# Patient Record
Sex: FEMALE | Race: WHITE | NOT HISPANIC OR LATINO
[De-identification: names, ages, dates, MRNs, and addresses within clinical notes are randomized per-mention and may not be internally consistent; named-entity substitution may affect disease eponyms.]

---

## 2020-10-04 DIAGNOSIS — Z01.818 ENCOUNTER FOR OTHER PREPROCEDURAL EXAMINATION: ICD-10-CM

## 2020-10-05 ENCOUNTER — APPOINTMENT (OUTPATIENT)
Dept: DISASTER EMERGENCY | Facility: CLINIC | Age: 75
End: 2020-10-05

## 2020-10-06 LAB — SARS-COV-2 N GENE NPH QL NAA+PROBE: NOT DETECTED

## 2020-10-08 ENCOUNTER — RESULT REVIEW (OUTPATIENT)
Age: 75
End: 2020-10-08

## 2020-10-08 ENCOUNTER — OUTPATIENT (OUTPATIENT)
Dept: OUTPATIENT SERVICES | Facility: HOSPITAL | Age: 75
LOS: 1 days | End: 2020-10-08
Payer: MEDICARE

## 2020-10-08 VITALS
WEIGHT: 250 LBS | HEIGHT: 65 IN | HEART RATE: 77 BPM | TEMPERATURE: 97 F | RESPIRATION RATE: 12 BRPM | SYSTOLIC BLOOD PRESSURE: 155 MMHG | OXYGEN SATURATION: 98 % | DIASTOLIC BLOOD PRESSURE: 64 MMHG

## 2020-10-08 VITALS
HEIGHT: 65 IN | TEMPERATURE: 97 F | RESPIRATION RATE: 20 BRPM | WEIGHT: 250 LBS | SYSTOLIC BLOOD PRESSURE: 135 MMHG | DIASTOLIC BLOOD PRESSURE: 77 MMHG | OXYGEN SATURATION: 100 % | HEART RATE: 88 BPM

## 2020-10-08 DIAGNOSIS — Z46.51 ENCOUNTER FOR FITTING AND ADJUSTMENT OF GASTRIC LAP BAND: Chronic | ICD-10-CM

## 2020-10-08 DIAGNOSIS — K59.01 SLOW TRANSIT CONSTIPATION: ICD-10-CM

## 2020-10-08 DIAGNOSIS — E66.01 MORBID (SEVERE) OBESITY DUE TO EXCESS CALORIES: ICD-10-CM

## 2020-10-08 DIAGNOSIS — Z86.010 PERSONAL HISTORY OF COLONIC POLYPS: ICD-10-CM

## 2020-10-08 DIAGNOSIS — Z90.49 ACQUIRED ABSENCE OF OTHER SPECIFIED PARTS OF DIGESTIVE TRACT: Chronic | ICD-10-CM

## 2020-10-08 PROCEDURE — 88305 TISSUE EXAM BY PATHOLOGIST: CPT | Mod: 26

## 2020-10-08 PROCEDURE — 45385 COLONOSCOPY W/LESION REMOVAL: CPT | Mod: PT

## 2020-10-08 PROCEDURE — C1889: CPT

## 2020-10-08 PROCEDURE — 88305 TISSUE EXAM BY PATHOLOGIST: CPT

## 2020-10-08 PROCEDURE — 45380 COLONOSCOPY AND BIOPSY: CPT | Mod: XS,PT

## 2020-10-08 PROCEDURE — 45381 COLONOSCOPY SUBMUCOUS NJX: CPT | Mod: PT

## 2020-10-08 RX ORDER — SODIUM CHLORIDE 9 MG/ML
1000 INJECTION INTRAMUSCULAR; INTRAVENOUS; SUBCUTANEOUS
Refills: 0 | Status: DISCONTINUED | OUTPATIENT
Start: 2020-10-08 | End: 2020-10-22

## 2020-10-08 RX ADMIN — SODIUM CHLORIDE 30 MILLILITER(S): 9 INJECTION INTRAMUSCULAR; INTRAVENOUS; SUBCUTANEOUS at 08:12

## 2020-10-08 NOTE — PRE-ANESTHESIA EVALUATION ADULT - NSANTHOSAYNRD_GEN_A_CORE
No. LILIBETH screening performed.  STOP BANG Legend: 0-2 = LOW Risk; 3-4 = INTERMEDIATE Risk; 5-8 = HIGH Risk

## 2020-10-08 NOTE — ASU DISCHARGE PLAN (ADULT/PEDIATRIC) - CALL YOUR DOCTOR IF YOU HAVE ANY OF THE FOLLOWING:
Pain not relieved by Medications/Nausea and vomiting that does not stop/Increased irritability or sluggishness/Fever greater than (need to indicate Fahrenheit or Celsius)/Unable to urinate/Excessive diarrhea/100.4 F/Inability to tolerate liquids or foods/Bleeding that does not stop

## 2020-10-08 NOTE — ASU PATIENT PROFILE, ADULT - PMH
High cholesterol    Hypertension, unspecified type  patient poor historian. Patient unsure why she takes amlodipine. Patient states BP goes high and low  Malignant neoplasm of uterus, unspecified site

## 2020-10-08 NOTE — ASU DISCHARGE PLAN (ADULT/PEDIATRIC) - ASU DC SPECIAL INSTRUCTIONSFT
Follow up visit with Dr. Patricia Regalado in about one week to discuss pathology and to determine further management.

## 2020-10-08 NOTE — PROGRESS NOTE ADULT - SUBJECTIVE AND OBJECTIVE BOX
Attending Physician:                            Procedure:    Indication for Procedure:  ________________________________________________________  PAST MEDICAL & SURGICAL HISTORY:  Hypertension, unspecified type  patient poor historian. Patient unsure why she takes amlodipine. Patient states BP goes high and low    Malignant neoplasm of uterus, unspecified site    High cholesterol    History of cholecystectomy    Admission for adjustment of gastric lap band  gastric lap band      ALLERGIES:  latex (Rash)  No Known Drug Allergies    HOME MEDICATIONS:  amLODIPine 2.5 mg oral tablet: 1 tab(s) orally once a day  aspirin 81 mg oral tablet, chewable: 1 tab(s) orally once a day  atorvastatin 10 mg oral tablet: 1 tab(s) orally once a day  doxycycline 20 mg oral capsule: 1 cap(s) orally every 12 hours  Lasix 20 mg oral tablet: 1 tab(s) orally once a day  Vitamin D3 2000 intl units (50 mcg) oral tablet:     AICD/PPM: [ ] yes   [ ] no    PERTINENT LAB DATA:                      PHYSICAL EXAMINATION:    Height (cm): 165.1  Weight (kg): 113.4  BMI (kg/m2): 41.6  BSA (m2): 2.17T(C): 36.2  HR: 77  BP: 155/64  RR: 12  SpO2: 98%    Constitutional: NAD    Neck:  No JVD  Respiratory: CTAB/L  Cardiovascular: S1 and S2  Gastrointestinal: BS+, soft, NT/ND  Extremities: No peripheral edema  Neurological: A/O x 3, no focal deficits        COMMENTS:    The patient is a suitable candidate for the planned procedure unless box checked [ ]  No, explain:

## 2020-10-12 LAB — SURGICAL PATHOLOGY STUDY: SIGNIFICANT CHANGE UP

## 2020-10-15 PROBLEM — E78.00 PURE HYPERCHOLESTEROLEMIA, UNSPECIFIED: Chronic | Status: ACTIVE | Noted: 2020-10-08

## 2020-10-19 DIAGNOSIS — Z86.010 PERSONAL HISTORY OF COLONIC POLYPS: ICD-10-CM

## 2020-10-19 DIAGNOSIS — K21.9 GASTRO-ESOPHAGEAL REFLUX DISEASE W/OUT ESOPHAGITIS: ICD-10-CM

## 2020-10-19 DIAGNOSIS — Z85.42 PERSONAL HISTORY OF MALIGNANT NEOPLASM OF OTHER PARTS OF UTERUS: ICD-10-CM

## 2020-10-19 DIAGNOSIS — K06.9 DISORDER OF GINGIVA AND EDENTULOUS ALVEOLAR RIDGE, UNSPECIFIED: ICD-10-CM

## 2020-10-19 DIAGNOSIS — Z83.3 FAMILY HISTORY OF DIABETES MELLITUS: ICD-10-CM

## 2020-10-19 DIAGNOSIS — Z87.19 PERSONAL HISTORY OF OTHER DISEASES OF THE DIGESTIVE SYSTEM: ICD-10-CM

## 2020-10-19 DIAGNOSIS — R09.89 OTHER SPECIFIED SYMPTOMS AND SIGNS INVOLVING THE CIRCULATORY AND RESPIRATORY SYSTEMS: ICD-10-CM

## 2020-10-19 DIAGNOSIS — G47.30 SLEEP APNEA, UNSPECIFIED: ICD-10-CM

## 2020-10-19 DIAGNOSIS — Z82.5 FAMILY HISTORY OF ASTHMA AND OTHER CHRONIC LOWER RESPIRATORY DISEASES: ICD-10-CM

## 2020-10-19 RX ORDER — AMLODIPINE BESYLATE 5 MG/1
TABLET ORAL
Refills: 0 | Status: ACTIVE | COMMUNITY

## 2020-10-19 RX ORDER — DOXYCYCLINE HYCLATE 50 MG/1
CAPSULE ORAL
Refills: 0 | Status: ACTIVE | COMMUNITY

## 2020-10-19 RX ORDER — ATORVASTATIN CALCIUM 80 MG/1
TABLET, FILM COATED ORAL
Refills: 0 | Status: ACTIVE | COMMUNITY

## 2020-10-19 RX ORDER — FUROSEMIDE 20 MG/1
20 TABLET ORAL
Refills: 0 | Status: ACTIVE | COMMUNITY

## 2020-10-20 ENCOUNTER — APPOINTMENT (OUTPATIENT)
Dept: SURGERY | Facility: CLINIC | Age: 75
End: 2020-10-20
Payer: MEDICARE

## 2020-10-20 VITALS
TEMPERATURE: 97 F | WEIGHT: 250 LBS | DIASTOLIC BLOOD PRESSURE: 88 MMHG | HEART RATE: 72 BPM | RESPIRATION RATE: 16 BRPM | HEIGHT: 65 IN | BODY MASS INDEX: 41.65 KG/M2 | SYSTOLIC BLOOD PRESSURE: 165 MMHG | OXYGEN SATURATION: 98 %

## 2020-10-20 DIAGNOSIS — D12.1 BENIGN NEOPLASM OF APPENDIX: ICD-10-CM

## 2020-10-20 PROCEDURE — 99204 OFFICE O/P NEW MOD 45 MIN: CPT

## 2020-10-20 RX ORDER — GLUCOSA SU 2KCL/CHONDROITIN SU 500-400 MG
CAPSULE ORAL
Refills: 0 | Status: DISCONTINUED | COMMUNITY
End: 2020-10-20

## 2020-10-20 RX ORDER — CHROMIUM 200 MCG
TABLET ORAL
Refills: 0 | Status: ACTIVE | COMMUNITY

## 2020-10-20 NOTE — CONSULT LETTER
[Dear  ___] : Dear  [unfilled], [Consult Letter:] : I had the pleasure of evaluating your patient, [unfilled]. [Please see my note below.] : Please see my note below. [Consult Closing:] : Thank you very much for allowing me to participate in the care of this patient.  If you have any questions, please do not hesitate to contact me. [Sincerely,] : Sincerely, [FreeTextEntry3] : Salvatore Bennett M.D., F.A.C.S, F.A.S.C.R.S [DrKeira  ___] : Dr. CHAVEZ

## 2020-10-20 NOTE — ASSESSMENT
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  I recommended she undergo a laparoscopic appendectomy.  I explained the risks benefits and alternatives including the risks of bleeding infection the possible need for an open procedure and the low likelihood of malignancy on the final pathology which could potentially necessitate a right hemicolectomy.  I ordered a CT abdomen and pelvis to better evaluate her abdominal wall anatomy and to assess for position of the LAP-BAND.  She also has an appointment with an interventional gastroenterologist to discuss possible colonoscopic management of her polyp.

## 2020-10-20 NOTE — PHYSICAL EXAM
[Abdomen Tenderness] : ~T No ~M abdominal tenderness [No HSM] : no hepatosplenomegaly [Exam Deferred] : exam was deferred [Wheezing] : no wheezing was heard [Normal Rate and Rhythm] : normal rate and rhythm [No Rash or Lesion] : No rash or lesion [Alert] : alert [Calm] : calm [de-identified] : Obese, soft, nontender, well-healed midline incision without incisional hernias.  There is a palpable LAP-BAND port in the left upper quadrant. [de-identified] : nad [de-identified] : nl

## 2020-10-20 NOTE — HISTORY OF PRESENT ILLNESS
[FreeTextEntry1] : Heidi is a 74 year old female here for a f/u after colonoscopy at [facility] (10/08/20). \par Prolapsing polyp in appendix, could not be removed during colonoscopy. A 2 cm irregular appearing polyp in the sigmoid colon, removed and clip placed over polypectomy site. Both areas tattooed. \par Descending colon polyp: Tubular adenoma, Hepatic flexure polyp: hyperplastic, Cecum: hyperplastic. Appendix polyp: Sessile serrated adenoma with low grade dysplasia. Ascending colon "thick fold": no diagnostic histopathologic changes. Transverse colon polyp: Tubular Adenoma. \par Sigmoid colon polyp: Tubulovillous adenoma. Pmh: Uterine Cancer, s/p hysterectomy. Cholelithiasis, s/p cholecystectomy. S/P  section.  She also has a history of a LAP-BAND..  She states that she lost 80 pounds initially but gained back 30.  She has not had any recent imaging or band adjustments.  Her previous bariatric surgeon has retired.\par \par Today, patient reports feeling well. Denies abdominal pain. Hx of constipation.

## 2020-10-21 ENCOUNTER — OUTPATIENT (OUTPATIENT)
Dept: OUTPATIENT SERVICES | Facility: HOSPITAL | Age: 75
LOS: 1 days | End: 2020-10-21
Payer: MEDICARE

## 2020-10-21 ENCOUNTER — APPOINTMENT (OUTPATIENT)
Dept: CT IMAGING | Facility: CLINIC | Age: 75
End: 2020-10-21
Payer: MEDICARE

## 2020-10-21 DIAGNOSIS — Z46.51 ENCOUNTER FOR FITTING AND ADJUSTMENT OF GASTRIC LAP BAND: Chronic | ICD-10-CM

## 2020-10-21 DIAGNOSIS — Z90.49 ACQUIRED ABSENCE OF OTHER SPECIFIED PARTS OF DIGESTIVE TRACT: Chronic | ICD-10-CM

## 2020-10-21 DIAGNOSIS — D12.1 BENIGN NEOPLASM OF APPENDIX: ICD-10-CM

## 2020-10-21 PROCEDURE — 82565 ASSAY OF CREATININE: CPT

## 2020-10-21 PROCEDURE — 74177 CT ABD & PELVIS W/CONTRAST: CPT

## 2020-10-21 PROCEDURE — 74177 CT ABD & PELVIS W/CONTRAST: CPT | Mod: 26

## 2020-11-09 ENCOUNTER — OUTPATIENT (OUTPATIENT)
Dept: OUTPATIENT SERVICES | Facility: HOSPITAL | Age: 75
LOS: 1 days | End: 2020-11-09
Payer: MEDICARE

## 2020-11-09 VITALS
RESPIRATION RATE: 17 BRPM | SYSTOLIC BLOOD PRESSURE: 172 MMHG | HEIGHT: 65 IN | TEMPERATURE: 97 F | OXYGEN SATURATION: 96 % | DIASTOLIC BLOOD PRESSURE: 86 MMHG | HEART RATE: 83 BPM | WEIGHT: 251.99 LBS

## 2020-11-09 DIAGNOSIS — Z90.49 ACQUIRED ABSENCE OF OTHER SPECIFIED PARTS OF DIGESTIVE TRACT: Chronic | ICD-10-CM

## 2020-11-09 DIAGNOSIS — Z98.890 OTHER SPECIFIED POSTPROCEDURAL STATES: Chronic | ICD-10-CM

## 2020-11-09 DIAGNOSIS — D12.1 BENIGN NEOPLASM OF APPENDIX: ICD-10-CM

## 2020-11-09 DIAGNOSIS — G47.33 OBSTRUCTIVE SLEEP APNEA (ADULT) (PEDIATRIC): ICD-10-CM

## 2020-11-09 DIAGNOSIS — Z01.818 ENCOUNTER FOR OTHER PREPROCEDURAL EXAMINATION: ICD-10-CM

## 2020-11-09 DIAGNOSIS — Z46.51 ENCOUNTER FOR FITTING AND ADJUSTMENT OF GASTRIC LAP BAND: Chronic | ICD-10-CM

## 2020-11-09 DIAGNOSIS — Z98.84 BARIATRIC SURGERY STATUS: ICD-10-CM

## 2020-11-09 DIAGNOSIS — Z90.710 ACQUIRED ABSENCE OF BOTH CERVIX AND UTERUS: Chronic | ICD-10-CM

## 2020-11-09 DIAGNOSIS — D3A.020 BENIGN CARCINOID TUMOR OF THE APPENDIX: ICD-10-CM

## 2020-11-09 PROCEDURE — G0463: CPT

## 2020-11-09 PROCEDURE — U0003: CPT

## 2020-11-09 RX ORDER — CHOLECALCIFEROL (VITAMIN D3) 125 MCG
0 CAPSULE ORAL
Qty: 0 | Refills: 0 | DISCHARGE

## 2020-11-09 NOTE — H&P PST ADULT - NSICDXPASTSURGICALHX_GEN_ALL_CORE_FT
PAST SURGICAL HISTORY:  History of cholecystectomy many years ago    S/P colonoscopic polypectomy 10/2020    S/P complete hysterectomy [place] [id number]

## 2020-11-09 NOTE — H&P PST ADULT - NSICDXPASTMEDICALHX_GEN_ALL_CORE_FT
PAST MEDICAL HISTORY:  Benign hypertension     Benign neoplasm of appendix     Gastric banding status     H/O constipation     High cholesterol     [place] (hard of hearing) left hearing aid    Morbid obesity     Shingles in childhood    Uterine cancer surgery 1999  radiation     PAST MEDICAL HISTORY:  Benign hypertension     Benign neoplasm of appendix     Gastric banding status     H/O constipation     High cholesterol     [place] (hard of hearing) left hearing aid    Morbid obesity     LILIBETH (obstructive sleep apnea)     Shingles in childhood    Uterine cancer surgery 1999  radiation

## 2020-11-09 NOTE — H&P PST ADULT - NSICDXPROBLEM_GEN_ALL_CORE_FT
PROBLEM DIAGNOSES  Problem: Benign neuroendocrine neoplasm of appendix  Assessment and Plan: laparoscopic appenedctomy  possible open    Problem: Personal history of gastric banding  Assessment and Plan: pt to call and make appointment for deflation    Problem: LILIBETH (obstructive sleep apnea)  Assessment and Plan: sleep apnea protocol

## 2020-11-09 NOTE — H&P PST ADULT - HISTORY OF PRESENT ILLNESS
74 yr old female with hx of obesity, s/p lap band , hypertension, had routine colonoscopy dx with polyps Need surgery to remove polyps in appendix.     ***Lap band still intact called to IR to have deflated are closed. Pt given # and instructions to call in am and get it deflated.    **COVID  denies travel  denies s/s  denies known exposure  Swab at pst

## 2020-11-10 ENCOUNTER — APPOINTMENT (OUTPATIENT)
Dept: SURGERY | Facility: CLINIC | Age: 75
End: 2020-11-10
Payer: MEDICARE

## 2020-11-10 ENCOUNTER — TRANSCRIPTION ENCOUNTER (OUTPATIENT)
Age: 75
End: 2020-11-10

## 2020-11-10 VITALS
SYSTOLIC BLOOD PRESSURE: 152 MMHG | BODY MASS INDEX: 42.15 KG/M2 | WEIGHT: 253 LBS | DIASTOLIC BLOOD PRESSURE: 84 MMHG | RESPIRATION RATE: 18 BRPM | OXYGEN SATURATION: 98 % | HEIGHT: 65 IN | HEART RATE: 72 BPM

## 2020-11-10 DIAGNOSIS — Z46.51 ENCOUNTER FOR FITTING AND ADJUSTMENT OF GASTRIC LAP BAND: ICD-10-CM

## 2020-11-10 PROCEDURE — S2083 ADJUSTMENT GASTRIC BAND: CPT

## 2020-11-10 PROCEDURE — 99202 OFFICE O/P NEW SF 15 MIN: CPT | Mod: 25

## 2020-11-10 RX ORDER — NEOMYCIN SULFATE 500 MG/1
500 TABLET ORAL
Qty: 3 | Refills: 0 | Status: DISCONTINUED | COMMUNITY
Start: 2020-11-05 | End: 2020-11-10

## 2020-11-10 RX ORDER — METRONIDAZOLE 250 MG/1
250 TABLET ORAL
Qty: 3 | Refills: 0 | Status: DISCONTINUED | COMMUNITY
Start: 2020-11-05 | End: 2020-11-10

## 2020-11-10 NOTE — REASON FOR VISIT
[Initial Consult] : an initial consult for [S/P Bariatric Surgery] : s/p bariatric surgery [Band Adjustment] : band adjustment

## 2020-11-10 NOTE — HISTORY OF PRESENT ILLNESS
[de-identified] : Heidi No is a 74 year old female here for Lap band adjustment. \par \par She had her LAP-BAND done at [place] with Dr. Shipman over 10 years ago.  She states she has not had an adjustment in several years.  She presents today to empty her LAP-BAND in preparation for planned surgery tomorrow.  She is scheduled to have a laparoscopic appendectomy with Dr. 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  She says she lost over 100 pounds initially with a LAP-BAND and has since regained some of that.  She does not report regular reflux or heartburn.  She states she has no abdominal pain.

## 2020-11-10 NOTE — ASSESSMENT
[FreeTextEntry1] : 74-year-old female with lap band who requires adjustment of the band in preparation for planned surgery tomorrow.\par \par 1.0 mL was removed from the LAP-BAND today, leaving 0 mL in the LAP-BAND.\par \par

## 2020-11-10 NOTE — PROCEDURE
[FreeTextEntry1] : Skin prepped with alcohol\par Band port accessed with Nice needle\par \par 1.0 mL removed\par \par  0 mL total

## 2020-11-10 NOTE — PHYSICAL EXAM
[Obese, well nourished, in no acute distress] : obese, well nourished, in no acute distress [Normal] : pharynx is unremarkable, moist mucus membrane, no oral lesions [de-identified] : normal respirations and chest expansion  [de-identified] : soft, nontender, nondistended, port palpable left abdomen.

## 2020-11-11 ENCOUNTER — OUTPATIENT (OUTPATIENT)
Dept: OUTPATIENT SERVICES | Facility: HOSPITAL | Age: 75
LOS: 1 days | End: 2020-11-11
Payer: MEDICARE

## 2020-11-11 ENCOUNTER — RESULT REVIEW (OUTPATIENT)
Age: 75
End: 2020-11-11

## 2020-11-11 ENCOUNTER — APPOINTMENT (OUTPATIENT)
Dept: SURGERY | Facility: HOSPITAL | Age: 75
End: 2020-11-11
Payer: MEDICARE

## 2020-11-11 VITALS
OXYGEN SATURATION: 99 % | HEART RATE: 88 BPM | DIASTOLIC BLOOD PRESSURE: 83 MMHG | SYSTOLIC BLOOD PRESSURE: 158 MMHG | WEIGHT: 251.99 LBS | TEMPERATURE: 98 F | HEIGHT: 65 IN | RESPIRATION RATE: 18 BRPM

## 2020-11-11 VITALS — HEART RATE: 98 BPM | OXYGEN SATURATION: 97 %

## 2020-11-11 DIAGNOSIS — D12.1 BENIGN NEOPLASM OF APPENDIX: ICD-10-CM

## 2020-11-11 DIAGNOSIS — Z98.890 OTHER SPECIFIED POSTPROCEDURAL STATES: Chronic | ICD-10-CM

## 2020-11-11 DIAGNOSIS — Z90.710 ACQUIRED ABSENCE OF BOTH CERVIX AND UTERUS: Chronic | ICD-10-CM

## 2020-11-11 DIAGNOSIS — Z90.49 ACQUIRED ABSENCE OF OTHER SPECIFIED PARTS OF DIGESTIVE TRACT: Chronic | ICD-10-CM

## 2020-11-11 DIAGNOSIS — Z01.818 ENCOUNTER FOR OTHER PREPROCEDURAL EXAMINATION: ICD-10-CM

## 2020-11-11 PROBLEM — G47.33 OBSTRUCTIVE SLEEP APNEA (ADULT) (PEDIATRIC): Chronic | Status: ACTIVE | Noted: 2020-11-09

## 2020-11-11 PROBLEM — H91.90 UNSPECIFIED HEARING LOSS, UNSPECIFIED EAR: Chronic | Status: ACTIVE | Noted: 2020-11-09

## 2020-11-11 PROBLEM — C55 MALIGNANT NEOPLASM OF UTERUS, PART UNSPECIFIED: Chronic | Status: ACTIVE | Noted: 2020-11-09

## 2020-11-11 PROBLEM — Z98.84 BARIATRIC SURGERY STATUS: Chronic | Status: ACTIVE | Noted: 2020-11-09

## 2020-11-11 PROBLEM — E66.01 MORBID (SEVERE) OBESITY DUE TO EXCESS CALORIES: Chronic | Status: ACTIVE | Noted: 2020-11-09

## 2020-11-11 PROBLEM — B02.9 ZOSTER WITHOUT COMPLICATIONS: Chronic | Status: ACTIVE | Noted: 2020-11-09

## 2020-11-11 PROBLEM — Z87.19 PERSONAL HISTORY OF OTHER DISEASES OF THE DIGESTIVE SYSTEM: Chronic | Status: ACTIVE | Noted: 2020-11-09

## 2020-11-11 PROBLEM — I10 ESSENTIAL (PRIMARY) HYPERTENSION: Chronic | Status: ACTIVE | Noted: 2020-11-09

## 2020-11-11 LAB
BLD GP AB SCN SERPL QL: NEGATIVE — SIGNIFICANT CHANGE UP
RH IG SCN BLD-IMP: POSITIVE — SIGNIFICANT CHANGE UP

## 2020-11-11 PROCEDURE — 86900 BLOOD TYPING SEROLOGIC ABO: CPT

## 2020-11-11 PROCEDURE — C1889: CPT

## 2020-11-11 PROCEDURE — 44204 LAPARO PARTIAL COLECTOMY: CPT | Mod: 52

## 2020-11-11 PROCEDURE — 86850 RBC ANTIBODY SCREEN: CPT

## 2020-11-11 PROCEDURE — 88307 TISSUE EXAM BY PATHOLOGIST: CPT

## 2020-11-11 PROCEDURE — 88307 TISSUE EXAM BY PATHOLOGIST: CPT | Mod: 26

## 2020-11-11 PROCEDURE — 44970 LAPAROSCOPY APPENDECTOMY: CPT

## 2020-11-11 PROCEDURE — 86901 BLOOD TYPING SEROLOGIC RH(D): CPT

## 2020-11-11 PROCEDURE — C9399: CPT

## 2020-11-11 RX ORDER — OXYCODONE HYDROCHLORIDE 5 MG/1
1 TABLET ORAL
Qty: 30 | Refills: 0
Start: 2020-11-11 | End: 2020-11-15

## 2020-11-11 RX ORDER — OXYCODONE HYDROCHLORIDE 5 MG/1
1 TABLET ORAL
Qty: 12 | Refills: 0
Start: 2020-11-11 | End: 2020-11-12

## 2020-11-11 RX ORDER — FENTANYL CITRATE 50 UG/ML
50 INJECTION INTRAVENOUS ONCE
Refills: 0 | Status: DISCONTINUED | OUTPATIENT
Start: 2020-11-11 | End: 2020-11-11

## 2020-11-11 RX ORDER — CEFOTETAN DISODIUM 1 G
2 VIAL (EA) INJECTION ONCE
Refills: 0 | Status: DISCONTINUED | OUTPATIENT
Start: 2020-11-11 | End: 2020-11-11

## 2020-11-11 RX ORDER — LIDOCAINE HCL 20 MG/ML
0.2 VIAL (ML) INJECTION ONCE
Refills: 0 | Status: DISCONTINUED | OUTPATIENT
Start: 2020-11-11 | End: 2020-11-11

## 2020-11-11 RX ORDER — FENTANYL CITRATE 50 UG/ML
25 INJECTION INTRAVENOUS
Refills: 0 | Status: DISCONTINUED | OUTPATIENT
Start: 2020-11-11 | End: 2020-11-11

## 2020-11-11 RX ORDER — SODIUM CHLORIDE 9 MG/ML
3 INJECTION INTRAMUSCULAR; INTRAVENOUS; SUBCUTANEOUS EVERY 8 HOURS
Refills: 0 | Status: DISCONTINUED | OUTPATIENT
Start: 2020-11-11 | End: 2020-11-11

## 2020-11-11 RX ORDER — CHLORHEXIDINE GLUCONATE 213 G/1000ML
1 SOLUTION TOPICAL ONCE
Refills: 0 | Status: DISCONTINUED | OUTPATIENT
Start: 2020-11-11 | End: 2020-11-11

## 2020-11-11 RX ORDER — ONDANSETRON 8 MG/1
4 TABLET, FILM COATED ORAL ONCE
Refills: 0 | Status: DISCONTINUED | OUTPATIENT
Start: 2020-11-11 | End: 2020-11-11

## 2020-11-11 NOTE — ASU DISCHARGE PLAN (ADULT/PEDIATRIC) - CARE PROVIDER_API CALL
Salvatore Bennett  COLON/RECTAL SURGERY  [address] [facility], [facility] [address]  [place] [address]  Phone: [phone]  Fax: [phone]  Follow Up Time:

## 2020-11-11 NOTE — BRIEF OPERATIVE NOTE - OPERATION/FINDINGS
Laparoscopic appendectomy, COLLEEN.   Access gained in LUQ with Veress needle and 5mm port placed there  3 other ports in standard position  Mesoappendix taken with Ligasure  Base taken with EndoGIA purple load with a generous cuff of cecum to ensure polyp contained within specimen.

## 2020-11-11 NOTE — PRE-ANESTHESIA EVALUATION ADULT - NSANTHPMHFT_GEN_ALL_CORE
74F PMHx HTN (on amlodipine, lasix), HLD, GERD, obesity s/p lap band (lap band recently released by Dr. Bennett's office). Patient reports that her HTN is normally well controlled. Denies CP/SOB at rest and with ADL but patient notes that she is somewhat inactive. Stress test 10/2018 demonstrates no scintigraphic evidence of definite ischemia. 11/18 TTE: LVEF calculated 73%, grade I diastolic dysfunction, normal aortic valve. Patient notes allergy to prolonged adhesive bandaid exposure and remote history of cough with ACE inhibitors. Previously diagnosed with LILIBETH, never on CPAP. 74F PMHx HTN (on amlodipine, lasix), HLD, GERD, obesity s/p lap band (lap band recently released by Dr. Bennett's office). Patient reports that her HTN is normally well controlled. Denies CP/SOB at rest and with ADL but patient notes that she is somewhat inactive. Stress test 10/2018 demonstrates no scintigraphic evidence of definite ischemia. 11/18 TTE: LVEF calculated 73%, grade I diastolic dysfunction, normal aortic valve. Patient notes allergy to prolonged adhesive bandaid exposure and remote history of cough with ACE inhibitors. Previously diagnosed with LILIBETH, never on CPAP. Patient found to have appendiceal polyps on colonoscopy, now for appendectomy.

## 2020-11-11 NOTE — ASU DISCHARGE PLAN (ADULT/PEDIATRIC) - ASU DC SPECIAL INSTRUCTIONSFT
WOUND CARE:  Please keep incisions clean and dry. Please do not Scrub or rub incisions. Do not use lotion or powder on incisions.   BATHING: Please do not submerge wound underwater. You may shower and/or sponge bathe.  ACTIVITY: No heavy lifting or straining. Otherwise, you may return to your usual level of physical activity. If you are taking narcotic pain medication (such as oxycodone) DO NOT drive a car, operate machinery or make important decisions.  DIET: Return to your usual diet.  NOTIFY YOUR SURGEON IF: You have any bleeding that does not stop, any pus draining from your wound(s), any fever (over 100.4 F) or chills, persistent nausea/vomiting, persistent diarrhea, or if your pain is not controlled on your discharge pain medications.  FOLLOW-UP: Please follow up with your primary care physician in one week regarding your hospitalization. Please follow-up with your surgeon, within 7 days following discharge- please call to schedule an appointment.

## 2020-11-24 ENCOUNTER — APPOINTMENT (OUTPATIENT)
Dept: SURGERY | Facility: CLINIC | Age: 75
End: 2020-11-24
Payer: MEDICARE

## 2020-11-24 VITALS
OXYGEN SATURATION: 98 % | DIASTOLIC BLOOD PRESSURE: 81 MMHG | TEMPERATURE: 97.9 F | HEART RATE: 85 BPM | SYSTOLIC BLOOD PRESSURE: 152 MMHG | RESPIRATION RATE: 16 BRPM

## 2020-11-24 DIAGNOSIS — Z09 ENCOUNTER FOR FOLLOW-UP EXAMINATION AFTER COMPLETED TREATMENT FOR CONDITIONS OTHER THAN MALIGNANT NEOPLASM: ICD-10-CM

## 2020-11-24 PROCEDURE — 99024 POSTOP FOLLOW-UP VISIT: CPT

## 2020-11-24 NOTE — HISTORY OF PRESENT ILLNESS
[FreeTextEntry1] : Heidi is a 73 y/o female here for post-operative visit. 11/11/20- laparoscopic lysis of adhesions and laparoscopic appendectomy with resection at the base of the cecum. Pathology: Appendix and base of cecum, resection- sessile serrated lesion with focal low-grade adenomatous dysplasia, see note. Cecal margin negative for serrated lesion. Today, patient reports feeling well. Denies incisional pain. Reports 3 BMs daily.

## 2020-11-24 NOTE — ASSESSMENT
[FreeTextEntry1] : I, Carolyn Bagley RN, attest I was present throughout the visit.\par \par In summary the patient is doing well status post laparoscopic appendectomy with resection of the base of the cecum for a sessile serrated adenoma of the appendiceal orifice.  The cecal margin is negative.  I reassured the patient that there is no further work-up or treatment indicated.  She will follow-up with Dr. Patricia Regalado for surveillance colonoscopy and will follow up with Dr. Choudhury for management of her LAP-BAND.\par

## 2020-11-27 NOTE — PRE-ANESTHESIA EVALUATION ADULT - WEIGHT IN LBS
----- Message from Nano Ford MD sent at 11/27/2020  3:44 PM CST -----  Called with CT scan results- no answer so voicemail left with results as negative   251.9

## 2020-12-07 ENCOUNTER — APPOINTMENT (OUTPATIENT)
Dept: SURGERY | Facility: CLINIC | Age: 75
End: 2020-12-07
Payer: MEDICARE

## 2020-12-07 VITALS
TEMPERATURE: 97.6 F | DIASTOLIC BLOOD PRESSURE: 76 MMHG | SYSTOLIC BLOOD PRESSURE: 130 MMHG | HEIGHT: 65 IN | BODY MASS INDEX: 42.49 KG/M2 | WEIGHT: 255 LBS | RESPIRATION RATE: 16 BRPM | OXYGEN SATURATION: 98 % | HEART RATE: 75 BPM

## 2020-12-07 DIAGNOSIS — Z98.84 BARIATRIC SURGERY STATUS: ICD-10-CM

## 2020-12-07 DIAGNOSIS — E66.9 OBESITY, UNSPECIFIED: ICD-10-CM

## 2020-12-07 PROCEDURE — 99213 OFFICE O/P EST LOW 20 MIN: CPT | Mod: 25

## 2020-12-07 PROCEDURE — S2083 ADJUSTMENT GASTRIC BAND: CPT

## 2020-12-07 NOTE — PLAN
[FreeTextEntry1] : [] liquids PO x 2 days, followed by 2 days of pureed foods, followed by regular diet\par [] advised to maintain appropriate lap band diet, chew thoroughly, and eat slowly\par [] regular follow-up with nutritionist is recommended\par [] exercise as tolerated\par [] Reminded patient that she is due for her annual upper GI series.\par \par F/u 1 month

## 2020-12-07 NOTE — PHYSICAL EXAM
[Obese, well nourished, in no acute distress] : obese, well nourished, in no acute distress [Normal] : pharynx is unremarkable, moist mucus membrane, no oral lesions [de-identified] : normal respirations and chest expansion  [de-identified] : soft, nontender, nondistended, port palpable left abdomen.

## 2020-12-07 NOTE — HISTORY OF PRESENT ILLNESS
[de-identified] : Heidi is a 75 y/o female here for follow up visit. She had her LAP-BAND done at [place] with Dr. Shipman over 10 years ago. She is s/p laparoscopic lysis of adhesions and laparoscopic appendectomy with resection at the base of the cecum by Dr. Bennett on 11/11/20. Had fluid removed prior to surgery.  She returns today to have fluid replaced in the LAP-BAND.\par \par She reports dysphagia, however only when she eats chicken.  She otherwise is doing well and has no complaints.  She does not feel she has good restriction now with the band empty.

## 2020-12-07 NOTE — BRIEF OPERATIVE NOTE - NSICDXBRIEFPOSTOP_GEN_ALL_CORE_FT
no lesions,  no deformities,  no traumatic injuries,  no significant scars are present,  chest wall non-tender,  no masses present, breathing is unlabored without accessory muscle use, normal breath sounds
POST-OP DIAGNOSIS:  Appendiceal tumor 11-Nov-2020 09:42:38  Isael Hernandez

## 2020-12-07 NOTE — PROCEDURE
[FreeTextEntry1] : Skin prepped with alcohol\par Band port accessed with Nice needle\par \par 0.5 mL found in lap band\par 1 mL added \par \par 1.5 mL total in lap band

## 2020-12-07 NOTE — ASSESSMENT
[FreeTextEntry1] : 74-year-old female with lap band who requires adjustment of the band to assist in weight loss\par \par 1.0 mL was added to the LAP-BAND today, leaving 1.5 mL in the LAP-BAND.\par \par

## 2021-02-18 NOTE — PRE-OP CHECKLIST - BOWEL PREP
Contacted patient regarding her voice mail.  Questions / concerns addressed.  Patient appreciative of care.       done/0500 ensure. prep 11/10/20

## 2021-07-20 NOTE — ASU PREOP CHECKLIST - HEIGHT IN FEET
----- Message from [facility] sent at 7/20/2021 10:11 AM EDT -----  Subject: Message to Provider    QUESTIONS  Information for Provider? Son Buck Pena, called back, returning the call to   office. Told him patient (mom) needs to schedule AWV after 8/20/21 per   note. He said he cannot say for sure at this time, but will call back at a   later time to do so. NO need to callback, Thank You.   ---------------------------------------------------------------------------  --------------  CALL BACK INFO  What is the best way for the office to contact you? Do not leave any   message, patient will call back for answer  Preferred Call Back Phone Number? [phone]  ---------------------------------------------------------------------------  --------------  SCRIPT ANSWERS  Relationship to Patient? Other  Representative Name? Mireya Rivero  Is the Representative on the appropriate HIPAA document in Epic?  Yes 5

## 2021-12-03 ENCOUNTER — APPOINTMENT (OUTPATIENT)
Dept: MAMMOGRAPHY | Facility: CLINIC | Age: 76
End: 2021-12-03
Payer: MEDICARE

## 2021-12-03 PROCEDURE — 77063 BREAST TOMOSYNTHESIS BI: CPT

## 2021-12-03 PROCEDURE — 77067 SCR MAMMO BI INCL CAD: CPT

## 2022-12-06 ENCOUNTER — APPOINTMENT (OUTPATIENT)
Dept: RADIOLOGY | Facility: CLINIC | Age: 77
End: 2022-12-06

## 2022-12-06 ENCOUNTER — APPOINTMENT (OUTPATIENT)
Dept: MAMMOGRAPHY | Facility: CLINIC | Age: 77
End: 2022-12-06

## 2022-12-06 PROCEDURE — 77080 DXA BONE DENSITY AXIAL: CPT

## 2022-12-06 PROCEDURE — 77067 SCR MAMMO BI INCL CAD: CPT

## 2022-12-06 PROCEDURE — 77063 BREAST TOMOSYNTHESIS BI: CPT

## 2023-10-19 ENCOUNTER — TRANSCRIPTION ENCOUNTER (OUTPATIENT)
Age: 78
End: 2023-10-19

## 2023-10-19 ENCOUNTER — OUTPATIENT (OUTPATIENT)
Dept: OUTPATIENT SERVICES | Facility: HOSPITAL | Age: 78
LOS: 1 days | End: 2023-10-19
Payer: MEDICARE

## 2023-10-19 VITALS
HEART RATE: 70 BPM | HEIGHT: 63 IN | DIASTOLIC BLOOD PRESSURE: 71 MMHG | WEIGHT: 240.08 LBS | OXYGEN SATURATION: 99 % | SYSTOLIC BLOOD PRESSURE: 159 MMHG | TEMPERATURE: 98 F | RESPIRATION RATE: 15 BRPM

## 2023-10-19 VITALS
OXYGEN SATURATION: 98 % | HEART RATE: 73 BPM | SYSTOLIC BLOOD PRESSURE: 178 MMHG | DIASTOLIC BLOOD PRESSURE: 80 MMHG | RESPIRATION RATE: 15 BRPM

## 2023-10-19 DIAGNOSIS — Z90.710 ACQUIRED ABSENCE OF BOTH CERVIX AND UTERUS: Chronic | ICD-10-CM

## 2023-10-19 DIAGNOSIS — R10.9 UNSPECIFIED ABDOMINAL PAIN: ICD-10-CM

## 2023-10-19 DIAGNOSIS — Z98.890 OTHER SPECIFIED POSTPROCEDURAL STATES: Chronic | ICD-10-CM

## 2023-10-19 DIAGNOSIS — Z90.49 ACQUIRED ABSENCE OF OTHER SPECIFIED PARTS OF DIGESTIVE TRACT: Chronic | ICD-10-CM

## 2023-10-19 PROCEDURE — 88305 TISSUE EXAM BY PATHOLOGIST: CPT | Mod: 26

## 2023-10-19 PROCEDURE — 43239 EGD BIOPSY SINGLE/MULTIPLE: CPT

## 2023-10-19 PROCEDURE — 88305 TISSUE EXAM BY PATHOLOGIST: CPT

## 2023-10-19 PROCEDURE — 94640 AIRWAY INHALATION TREATMENT: CPT

## 2023-10-19 DEVICE — NET RETRV ROT ROTH 2.5MMX230CM: Type: IMPLANTABLE DEVICE | Status: FUNCTIONAL

## 2023-10-19 RX ORDER — AMLODIPINE BESYLATE 2.5 MG/1
1 TABLET ORAL
Qty: 0 | Refills: 0 | DISCHARGE

## 2023-10-19 RX ORDER — ATORVASTATIN CALCIUM 80 MG/1
1 TABLET, FILM COATED ORAL
Qty: 0 | Refills: 0 | DISCHARGE

## 2023-10-19 RX ORDER — SOLIFENACIN SUCCINATE 10 MG/1
1 TABLET ORAL
Refills: 0 | DISCHARGE

## 2023-10-19 RX ORDER — LIDOCAINE HCL 20 MG/ML
4 VIAL (ML) INJECTION ONCE
Refills: 0 | Status: COMPLETED | OUTPATIENT
Start: 2023-10-19 | End: 2023-10-19

## 2023-10-19 RX ORDER — SODIUM CHLORIDE 9 MG/ML
500 INJECTION INTRAMUSCULAR; INTRAVENOUS; SUBCUTANEOUS
Refills: 0 | Status: COMPLETED | OUTPATIENT
Start: 2023-10-19 | End: 2023-10-19

## 2023-10-19 RX ORDER — CHOLECALCIFEROL (VITAMIN D3) 125 MCG
1 CAPSULE ORAL
Qty: 0 | Refills: 0 | DISCHARGE

## 2023-10-19 RX ORDER — FUROSEMIDE 40 MG
1 TABLET ORAL
Qty: 0 | Refills: 0 | DISCHARGE

## 2023-10-19 RX ORDER — ASPIRIN/CALCIUM CARB/MAGNESIUM 324 MG
1 TABLET ORAL
Qty: 0 | Refills: 0 | DISCHARGE

## 2023-10-19 RX ADMIN — Medication 4 MILLILITER(S): at 10:47

## 2023-10-19 RX ADMIN — SODIUM CHLORIDE 30 MILLILITER(S): 9 INJECTION INTRAMUSCULAR; INTRAVENOUS; SUBCUTANEOUS at 11:46

## 2023-10-19 NOTE — ASU PREOP CHECKLIST - TYPE OF SOLUTION
NS Physical Exam  GEN: appears drowsy but easily arousable w/ verbal stimuli  EYES: full EOMI, perrl  ENT: External inspection normal, normal voice, no swelling/ecchymosis/trauma  HEAD: atraumatic  NECK: FROM neck, supple, no meningismus, trachea midline, no JVD  CV: rrr, no edema  RESP: cta bl, no tachypnea, no hypoxia, no resp distress,  GI: +BS, Soft, nontender, no guarding/rebound,   MSK: FROM all 4 extremities,   SKIN: Color normal for race, warm and dry, no rash  NEURO: Oriented x3, CN 2-12 grossly intact, strength equal bl, soler x 4, clear speech,

## 2023-10-19 NOTE — ASU DISCHARGE PLAN (ADULT/PEDIATRIC) - NS MD DC FALL RISK RISK
For information on Fall & Injury Prevention, visit: [email or web address] OR  [email or web address] OR  https://www.cdc.gov/steadi/patient.html

## 2023-10-19 NOTE — PRE PROCEDURE NOTE - PRE PROCEDURE EVALUATION
Attending Physician:  Marlena Regalado                          Procedure: EGD    Indication for Procedure: CT thickened esophagus  ________________________________________________________  PAST MEDICAL & SURGICAL HISTORY:  High cholesterol  Benign neoplasm of appendix  Gastric banding status  Morbid obesity  Benign hypertension  Uterine cancer surgery 1999, radiation  [place] (hard of hearing) left hearing aid  Shingles in childhood  H/O constipation  LILIBETH (obstructive sleep apnea)  History of cholecystectomy  S/P complete hysterectomy [facility]BS[facility] 1999  S/P colonoscopic polypectomy 10/2020      ALLERGIES:  adhesives (Rash)  No Known Drug Allergies    HOME MEDICATIONS:  amLODIPine 2.5 mg oral tablet: 1 tab(s) orally once a day  atorvastatin 10 mg oral tablet: 1 tab(s) orally once a day  doxycycline 20 mg oral capsule: 1 cap(s) orally every 12 hours  Lasix 20 mg oral tablet: 1 tab(s) orally once a day  solifenacin 5 mg oral tablet: 1 tab(s) orally  Vitamin D3 2000 intl units (50 mcg) oral tablet: 1 tab(s) orally once a day    AICD/PPM: [ ] yes   [X] no    PERTINENT LAB DATA:                      PHYSICAL EXAMINATION:    Height (cm): 160  Weight (kg): 108.9  BMI (kg/m2): 42.5  BSA (m2): 2.09T(C): 36.4  HR: 70  BP: 159/71  RR: 15  SpO2: 99%    Constitutional: NAD    Neck:  No JVD  Respiratory: CTAB/L  Cardiovascular: S1 and S2  Gastrointestinal: BS+, soft, NT/ND  Extremities: No peripheral edema  Neurological: A/O x 3, no focal deficits        COMMENTS:    The patient is a suitable candidate for the planned procedure unless box checked [ ]  No, explain:

## 2023-10-19 NOTE — ASU PATIENT PROFILE, ADULT - NSICDXPASTMEDICALHX_GEN_ALL_CORE_FT
PAST MEDICAL HISTORY:  Benign hypertension     Benign neoplasm of appendix     Gastric banding status     H/O constipation     High cholesterol     [place] (hard of hearing) left hearing aid    Morbid obesity     LILIBETH (obstructive sleep apnea)     Shingles in childhood    Uterine cancer surgery 1999  radiation

## 2023-10-19 NOTE — ASU PATIENT PROFILE, ADULT - NSICDXPASTSURGICALHX_GEN_ALL_CORE_FT
PAST SURGICAL HISTORY:  History of cholecystectomy many years ago    S/P colonoscopic polypectomy 10/2020    S/P complete hysterectomy [place] [id number]

## 2023-10-19 NOTE — ASU PATIENT PROFILE, ADULT - FALL HARM RISK - HARM RISK INTERVENTIONS

## 2023-10-19 NOTE — ASU PREOP CHECKLIST - INTERNAL PROSTHESES
Hematospermia is almost always a benign condition.  If he has no other symptoms treatment is not necessary.  If he has some symptoms, it could be due to a prostate infection.  In this case he may need antibiotics.  I have not seen this patient in over a year and he is on testosterone replacement so it sounds like he needs an office follow-up.  There is no urgency for this condition.   no

## 2023-10-19 NOTE — PRE-ANESTHESIA EVALUATION ADULT - MALLAMPATI CLASS
Coronavirus (COVID-19) Notification  Your result for COVID-19 is Negative  Letter sent that will serve as a formal notice for your employer Class II - visualization of the soft palate, fauces, and uvula

## 2023-10-23 LAB
SURGICAL PATHOLOGY STUDY: SIGNIFICANT CHANGE UP
SURGICAL PATHOLOGY STUDY: SIGNIFICANT CHANGE UP

## 2023-12-28 ENCOUNTER — APPOINTMENT (OUTPATIENT)
Dept: OBGYN | Facility: CLINIC | Age: 78
End: 2023-12-28
Payer: MEDICARE

## 2023-12-28 ENCOUNTER — APPOINTMENT (OUTPATIENT)
Dept: MAMMOGRAPHY | Facility: CLINIC | Age: 78
End: 2023-12-28
Payer: MEDICARE

## 2023-12-28 ENCOUNTER — TRANSCRIPTION ENCOUNTER (OUTPATIENT)
Age: 78
End: 2023-12-28

## 2023-12-28 PROCEDURE — 77067 SCR MAMMO BI INCL CAD: CPT

## 2023-12-28 PROCEDURE — G0101: CPT

## 2023-12-28 PROCEDURE — 77063 BREAST TOMOSYNTHESIS BI: CPT

## 2024-01-10 ENCOUNTER — APPOINTMENT (OUTPATIENT)
Dept: OTOLARYNGOLOGY | Facility: CLINIC | Age: 79
End: 2024-01-10
Payer: MEDICARE

## 2024-01-10 VITALS
HEIGHT: 63 IN | DIASTOLIC BLOOD PRESSURE: 85 MMHG | BODY MASS INDEX: 41.64 KG/M2 | HEART RATE: 79 BPM | SYSTOLIC BLOOD PRESSURE: 158 MMHG | WEIGHT: 235 LBS

## 2024-01-10 DIAGNOSIS — H90.3 SENSORINEURAL HEARING LOSS, BILATERAL: ICD-10-CM

## 2024-01-10 PROCEDURE — 99204 OFFICE O/P NEW MOD 45 MIN: CPT

## 2024-01-10 PROCEDURE — 92557 COMPREHENSIVE HEARING TEST: CPT

## 2024-01-10 PROCEDURE — 92567 TYMPANOMETRY: CPT

## 2024-01-10 RX ORDER — SOLIFENACIN SUCCINATE 5 MG/1
5 TABLET ORAL
Refills: 0 | Status: ACTIVE | COMMUNITY

## 2024-01-10 RX ORDER — ASPIRIN 81 MG
81 TABLET, DELAYED RELEASE (ENTERIC COATED) ORAL
Refills: 0 | Status: DISCONTINUED | COMMUNITY
End: 2024-01-10

## 2024-01-10 NOTE — DATA REVIEWED
[de-identified] : AD: Hearing WNL at .25kHz sloping to a mild to moderate SNHL .5-8kHz w/ excellent WRS and type A tymp. AS: Mild to moderately-severe SNHL .25-8kHz w/ excellent WRS and type A tymp.

## 2024-01-10 NOTE — CONSULT LETTER
[Dear  ___] : Dear  [unfilled], [Courtesy Letter:] : I had the pleasure of seeing your patient, [unfilled], in my office today. [Please see my note below.] : Please see my note below. [Consult Closing:] : Thank you very much for allowing me to participate in the care of this patient.  If you have any questions, please do not hesitate to contact me. [Sincerely,] : Sincerely, [FreeTextEntry2] : Octavio Paul MD [address] [place] [FreeTextEntry3] : Isreal Fields MD, FACS Chair of Otolaryngology, [facility] Professor of Otolaryngology & Molecular Medicine, [employer] at [facility]

## 2024-01-10 NOTE — HISTORY OF PRESENT ILLNESS
[de-identified] : 77 yo F with bilateral hearing loss since 2017 presents for follow up. Previously saw Dr. Renteria. Wears hearing aid AS in 2017 and was told in 2021 that should get HA AD. No tinnitus, otalgia, otorrhea, ear infections, dizziness/vertigo or headaches related to hearing. Had two episodes of vertigo - last one was 5 years ago - lasting under 20 min - possible BPPV.  Had frequent ear infections as a child - had tube placement once (unsure laterality). Hx of head trauma multiple times - last one about 2 years ago and no hx of exposure to loud noises. Father with hearing loss in his 70s and brother with hearing loss in his 70s. Last audio in 2021 and MRI Brain for memory loss concerns - told to be normal. 
[Negative] : Heme/Lymph

## 2024-01-11 ENCOUNTER — APPOINTMENT (OUTPATIENT)
Dept: MRI IMAGING | Facility: CLINIC | Age: 79
End: 2024-01-11
Payer: MEDICARE

## 2024-01-11 PROCEDURE — A9585: CPT

## 2024-01-11 PROCEDURE — 70553 MRI BRAIN STEM W/O & W/DYE: CPT

## 2024-01-18 ENCOUNTER — NON-APPOINTMENT (OUTPATIENT)
Age: 79
End: 2024-01-18

## 2024-03-05 ENCOUNTER — NON-APPOINTMENT (OUTPATIENT)
Age: 79
End: 2024-03-05

## 2024-05-15 NOTE — PRE-ANESTHESIA EVALUATION ADULT - NSATTENDATTESTRD_GEN_ALL_CORE
Please reach out to [facility] and notify of this referral. The patient has been re-examined and I agree with the above assessment or I updated with my findings.

## 2024-07-18 ENCOUNTER — APPOINTMENT (OUTPATIENT)
Age: 79
End: 2024-07-18
Payer: MEDICARE

## 2024-07-18 ENCOUNTER — APPOINTMENT (OUTPATIENT)
Age: 79
End: 2024-07-18

## 2024-07-18 PROCEDURE — 99213 OFFICE O/P EST LOW 20 MIN: CPT

## 2024-10-07 ENCOUNTER — APPOINTMENT (OUTPATIENT)
Facility: CLINIC | Age: 79
End: 2024-10-07
Payer: MEDICARE

## 2024-10-07 VITALS
HEART RATE: 77 BPM | BODY MASS INDEX: 40.4 KG/M2 | SYSTOLIC BLOOD PRESSURE: 152 MMHG | DIASTOLIC BLOOD PRESSURE: 87 MMHG | HEIGHT: 63 IN | WEIGHT: 228 LBS

## 2024-10-07 DIAGNOSIS — Z86.39 PERSONAL HISTORY OF OTHER ENDOCRINE, NUTRITIONAL AND METABOLIC DISEASE: ICD-10-CM

## 2024-10-07 DIAGNOSIS — Z86.79 PERSONAL HISTORY OF OTHER DISEASES OF THE CIRCULATORY SYSTEM: ICD-10-CM

## 2024-10-07 PROCEDURE — 99213 OFFICE O/P EST LOW 20 MIN: CPT

## 2024-10-07 RX ORDER — TIRZEPATIDE 2.5 MG/.5ML
2.5 INJECTION, SOLUTION SUBCUTANEOUS
Refills: 0 | Status: ACTIVE | COMMUNITY

## 2024-11-02 ENCOUNTER — APPOINTMENT (OUTPATIENT)
Dept: CT IMAGING | Facility: CLINIC | Age: 79
End: 2024-11-02

## 2024-11-02 PROCEDURE — 70491 CT SOFT TISSUE NECK W/DYE: CPT

## 2024-11-06 ENCOUNTER — RESULT REVIEW (OUTPATIENT)
Age: 79
End: 2024-11-06

## 2024-11-06 DIAGNOSIS — E04.1 NONTOXIC SINGLE THYROID NODULE: ICD-10-CM

## 2024-11-14 ENCOUNTER — OUTPATIENT (OUTPATIENT)
Dept: OUTPATIENT SERVICES | Facility: HOSPITAL | Age: 79
LOS: 1 days | End: 2024-11-14
Payer: MEDICARE

## 2024-11-14 ENCOUNTER — RESULT REVIEW (OUTPATIENT)
Age: 79
End: 2024-11-14

## 2024-11-14 ENCOUNTER — APPOINTMENT (OUTPATIENT)
Dept: ULTRASOUND IMAGING | Facility: IMAGING CENTER | Age: 79
End: 2024-11-14
Payer: MEDICARE

## 2024-11-14 DIAGNOSIS — Z90.710 ACQUIRED ABSENCE OF BOTH CERVIX AND UTERUS: Chronic | ICD-10-CM

## 2024-11-14 DIAGNOSIS — Z90.49 ACQUIRED ABSENCE OF OTHER SPECIFIED PARTS OF DIGESTIVE TRACT: Chronic | ICD-10-CM

## 2024-11-14 DIAGNOSIS — E04.1 NONTOXIC SINGLE THYROID NODULE: ICD-10-CM

## 2024-11-14 DIAGNOSIS — Z98.890 OTHER SPECIFIED POSTPROCEDURAL STATES: Chronic | ICD-10-CM

## 2024-11-14 PROCEDURE — 88173 CYTOPATH EVAL FNA REPORT: CPT | Mod: 26

## 2024-11-14 PROCEDURE — 10005 FNA BX W/US GDN 1ST LES: CPT

## 2024-11-20 PROCEDURE — 88173 CYTOPATH EVAL FNA REPORT: CPT

## 2024-11-20 PROCEDURE — 81445 SO NEO GSAP 5-50DNA/DNA&RNA: CPT

## 2024-11-20 PROCEDURE — 88172 CYTP DX EVAL FNA 1ST EA SITE: CPT

## 2024-11-20 PROCEDURE — 10005 FNA BX W/US GDN 1ST LES: CPT

## 2025-04-05 ENCOUNTER — NON-APPOINTMENT (OUTPATIENT)
Age: 80
End: 2025-04-05

## 2025-05-22 ENCOUNTER — APPOINTMENT (OUTPATIENT)
Age: 80
End: 2025-05-22

## 2025-09-03 ENCOUNTER — APPOINTMENT (OUTPATIENT)
Dept: RADIOLOGY | Facility: CLINIC | Age: 80
End: 2025-09-03
Payer: MEDICARE

## 2025-09-03 PROCEDURE — 77080 DXA BONE DENSITY AXIAL: CPT

## (undated) DEVICE — TUBING SUCTION 20FT

## (undated) DEVICE — BRUSH COLONOSCOPY CYTOLOGY

## (undated) DEVICE — BIOPSY FORCEP RADIAL JAW 4 STANDARD WITH NEEDLE

## (undated) DEVICE — IRRIGATOR BIO SHIELD

## (undated) DEVICE — FOLEY HOLDER STATLOCK 2 WAY ADULT

## (undated) DEVICE — SUCTION YANKAUER NO CONTROL VENT

## (undated) DEVICE — SYR LUER LOK 50CC

## (undated) DEVICE — BITE BLOCK ADULT 20 X 27MM (GREEN)

## (undated) DEVICE — BALLOON US ENDO

## (undated) DEVICE — NDL INJ SCLERO INTERJECT 23G

## (undated) DEVICE — SENSOR O2 FINGER ADULT

## (undated) DEVICE — NDL INJ SCLERO INTERJECT 25G

## (undated) DEVICE — CLAMP BX HOT RAD JAW 3

## (undated) DEVICE — FORCEP MULTIBITE MULTIPLE SAMPLE 2.4MM 2.8MM 240CM ORANGE DISP

## (undated) DEVICE — CATH IV SAFE BC 22G X 1" (BLUE)

## (undated) DEVICE — MARKER ENDO SPOT EX

## (undated) DEVICE — SYR ALLIANCE II INFLATION 60ML

## (undated) DEVICE — PACK IV START WITH CHG

## (undated) DEVICE — TUBING IV SET GRAVITY 3Y 100" MACRO

## (undated) DEVICE — CATH IV SAFE BC 20G X 1.16" (PINK)

## (undated) DEVICE — SOL INJ NS 0.9% 500ML 2 PORT

## (undated) DEVICE — FORCEP RADIAL JAW 4 JUMBO 2.8MM 3.2MM 240CM ORANGE DISP

## (undated) DEVICE — TUBING SUCTION CONN 6FT STERILE